# Patient Record
Sex: FEMALE | Race: WHITE
[De-identification: names, ages, dates, MRNs, and addresses within clinical notes are randomized per-mention and may not be internally consistent; named-entity substitution may affect disease eponyms.]

---

## 2018-10-09 ENCOUNTER — HOSPITAL ENCOUNTER (OUTPATIENT)
Dept: HOSPITAL 39 - GMAE | Age: 23
End: 2018-10-09
Attending: FAMILY MEDICINE
Payer: COMMERCIAL

## 2018-10-09 DIAGNOSIS — E11.9: Primary | ICD-10-CM

## 2018-10-09 DIAGNOSIS — N91.2: ICD-10-CM

## 2018-10-29 ENCOUNTER — HOSPITAL ENCOUNTER (OUTPATIENT)
Dept: HOSPITAL 39 - US | Age: 23
End: 2018-10-29
Attending: FAMILY MEDICINE
Payer: COMMERCIAL

## 2018-10-29 DIAGNOSIS — N91.2: Primary | ICD-10-CM

## 2018-10-29 NOTE — US
EXAM DESCRIPTION: 

Pelvis Transvaginal: Ultrasound.



CLINICAL HISTORY: 

AMENORRHEA



COMPARISON: 

None.



TECHNIQUE: 

Endovaginal scanning; Gray-scale and Doppler modes.



FINDINGS: 

Uterus 6.2 x 4.4 x 3.0 cm . Endometrial thickness is  2.8  mm.

Myometrium appears homogeneous. Uterus  not retroflexed. Cervix

unremarkable. Cul-de-sac contains no fluid.

Right ovary 4.1 x 2.6 x 2.1 cm.. Normal waveform and color

Doppler vascularity. Multiple follicles but no cysts. No adnexal

mass or free fluid. 

Left ovary 2.2 x 2.1 x 1.5 cm. Normal waveform and color Doppler

vascularity. Small follicles but no cysts. No adnexal mass or

free fluid.



IMPRESSION: 

1. Normal position and size of uterus. No endometrial thickening.

No fluid in the cul-de-sac.

2. Bilateral ovaries normal size and vascularity. Small follicles

bilaterally with no cysts. No adnexal mass or fluid.



Electronically signed by:  Renny Trotter MD  10/29/2018 3:45 PM

[facility] Workstation: [id number]

## 2018-12-03 ENCOUNTER — HOSPITAL ENCOUNTER (OUTPATIENT)
Dept: HOSPITAL 39 - GMAE | Age: 23
End: 2018-12-03
Attending: FAMILY MEDICINE
Payer: COMMERCIAL

## 2018-12-03 DIAGNOSIS — N91.2: Primary | ICD-10-CM

## 2018-12-03 DIAGNOSIS — N92.6: ICD-10-CM
